# Patient Record
Sex: MALE | NOT HISPANIC OR LATINO
[De-identification: names, ages, dates, MRNs, and addresses within clinical notes are randomized per-mention and may not be internally consistent; named-entity substitution may affect disease eponyms.]

---

## 2020-07-31 LAB — SARS-COV-2 N GENE NPH QL NAA+PROBE: NOT DETECTED

## 2020-08-04 ENCOUNTER — APPOINTMENT (OUTPATIENT)
Dept: PULMONOLOGY | Facility: CLINIC | Age: 60
End: 2020-08-04

## 2020-08-04 ENCOUNTER — APPOINTMENT (OUTPATIENT)
Dept: PULMONOLOGY | Facility: CLINIC | Age: 60
End: 2020-08-04
Payer: COMMERCIAL

## 2020-08-04 PROCEDURE — 88738 HGB QUANT TRANSCUTANEOUS: CPT

## 2020-08-04 PROCEDURE — 94060 EVALUATION OF WHEEZING: CPT

## 2020-08-04 PROCEDURE — 94727 GAS DIL/WSHOT DETER LNG VOL: CPT

## 2020-08-04 PROCEDURE — 99205 OFFICE O/P NEW HI 60 MIN: CPT | Mod: 25

## 2020-08-04 PROCEDURE — 94729 DIFFUSING CAPACITY: CPT

## 2020-08-04 PROCEDURE — ZZZZZ: CPT

## 2020-08-05 LAB — POCT - HEMOGLOBIN (HGB), QUANTITATIVE, TRANSCUTANEOUS: 15

## 2020-08-05 NOTE — PHYSICAL EXAM
[No Acute Distress] : no acute distress [Normal Appearance] : normal appearance [Supple] : supple [Thyroid Not Enlarged] : thyroid not enlarged [Normal S1, S2] : normal s1, s2 [No Murmurs] : no murmurs [No JVD] : no jvd [Clear to Auscultation Bilaterally] : clear to auscultation bilaterally [Normal to Percussion] : normal to percussion [No HSM] : no hsm [Benign] : benign [Not Tender] : not tender [No Cyanosis] : no cyanosis [No Clubbing] : no clubbing [Normal Bowel Sounds] : normal bowel sounds [No Edema] : no edema [No Focal Deficits] : no focal deficits [Oriented x3] : oriented x3

## 2020-08-07 NOTE — DISCUSSION/SUMMARY
[FreeTextEntry1] : Abnormal CT with mediastinal adenopathy some calcified and pulmonary nodules.\par Minimal parenchymal changes\par Most likely sarcoidosis.\par Cannot exclude other granulomatous disease.\par \par Overall nodes decreased without significant change in other findings on CT.\par Pt. clinically and functionally well.

## 2020-08-07 NOTE — PROCEDURE
[FreeTextEntry1] : \par Patient Name:  MARÍA MCDANIELS  Patient ID:  [id number]  \par  \par Patient :   1960 Gender:    Male  \par Accession Number:   [id number] Study Date:   2020 15:03 \par Referring Phys.:  Zachary Maza Performing Location:  [facility]  \par EXAM:  Thorax^CHEST_WO_ (Adult) \par \par \par \par  \par \par IMPRESSION:\par \par Hilar and mediastinal adenopathy have slightly decreased in size compared with the 2019 CT examination. Some of these lymph nodes are again partially calcified. These lymph nodes are again increased in number. This is again most consistent with old granulomatous disease, possibly sarcoidosis.\par \par Pulmonary findings are unchanged compared with the 2019 CT examination.\par \par There are additional findings as are noted in the body of the report.\par \par History: Follow-up lymphadenopathy.\par \par Examination/Technique: CT of the chest was performed without contrast. Comparison is made with the CT examination of the chest from 2019. From the dataset 1.0, 0.75, and 3.0 mm axial images were constructed, as well as axial thin-slab sliding maximum intensity projection images, i.e. 3-dimensional/volumetric images. Coronal and sagittal reformations were also constructed from the dataset and are reviewed. Automated exposure control, customized for the examination, was utilized to reduce radiation exposure to the patient.\par \par Lungs/Airways: As before, there is some increase in the AP diameter of the thorax, increase in the AP diameter of the trachea, and prominence of the diaphragmatic slips. Substantial emphysema is not identified. There is biapical pleural and parenchymal scarring which appears unchanged and relatively symmetric. The trachea is mildly dilated. The trachea and central bronchi are patent and are unchanged. There is some subsegmental atelectasis/air trapping in the lower lungs, in association with some patchy areas of ground-glass opacity and smooth interstitial thickening which is very similar to before. There is no evidence for diffuse fibrosis or honeycombing, but there is again a probable degree of mild pneumonitis.\par \par Abutting the left major fissure on image 52 of the current examination there is again an ovoid 6 mm nodule. There is a lateral right lower lobe subpleural tag on image 64 measuring 5 mm. This is also unchanged. There is a small subpleural tag in the posteromedial aspect of the superior segment of the right lower lobe on image 41. There are a few additional small pulmonary nodules which are unchanged. No new pulmonary nodules, masses, or cavities are identified.\par \par There is no right or left pleural effusion or pneumothorax.\par \par Lymph Nodes: There are again enlarged hilar and mediastinal lymph nodes which are increased in number, some of which are calcified. Right subcarinal adenopathy measures approximately 2.4 x 1.5 cm on image 55 of the current examination, and is slightly smaller than before. Left mediastinal lymph nodes are also slightly smaller. There is a 10 mm lymph node on image 41, and an anterior 14 mm conglomerate area of adenopathy. A right precarinal lymph node which is partially calcified measures 1.6 x 1.0 cm on image 41 of the current examination, compared with 2.0 x 1.2 cm on image 44 of the prior examination, and a 1.8 x 1.3 cm right pretracheal lymph node on image 38 of the current examination measured 19 x 16 mm on image 41 of the prior examination. There are similar changes for the superior and anterior mediastinal lymph nodes.\par \par Hilar adenopathy demonstrates slightly more calcification than before, without substantial changes in size or numbers. There are mildly enlarged bilateral axillary region lymph nodes containing central fat without calcification, which are unchanged. There are also some lower and posterior right mediastinal lymph nodes which are similar to before.\par \par Mediastinum: There is no thyroid nodule. There is no new dominant mediastinal mass. There is no pericardial effusion.\par \par Heart/Vessels: The heart is not enlarged. There are motion artifacts of the ascending aorta. The central vessels are not dilated. Further assessment of the heart and central vessels is not possible without intravenous contrast.\par \par Bones/Soft Tissues: There is some spurring of the thoracolumbar spine, as before. There are relatively mild degenerative changes of the osseous and cartilaginous structures of the anterior chest wall. There is some spurring at the shoulders. The visualized osseous structures are unchanged.\par \par There is mild anasarca. The chest wall is unchanged.\par \par Upper Abdomen: The liver is again denser than the spleen, which may represent iron deposition, measuring 58 Hounsfield units centrally on image 91, compared with 45 Hounsfield units for the spleen on this image. The liver spans approximately 23.8 x 13 cm oblique dimensions in the axial plane on image 91, and is mildly enlarged as before. There is no specific evidence for cirrhosis. No focal abnormalities are identified in the visualized liver.\par \par The visualized spleen, adrenals, kidneys, and pancreas are unchanged on these images obtained without contrast. The stomach is now mildly distended with fluid, food, and a small amount of hyperdense material. There is no ascites, mass, or significant adenopathy, in the upper abdomen. The visualized gallbladder is collapsed.\par \par Electronic Signature: I personally reviewed the images and agree with this report. Final Report: Dictated by and Signed by Attending Matthew England MD 2020 4:23 PM\par  \par \par 2020\par Pulmonary function testing\par Attached

## 2020-08-07 NOTE — ASSESSMENT
[FreeTextEntry1] : Labs drawn in office today\par Observation\par Obtain records especially pathology from bronch/EBUS\par F/U 6 months sooner PRN

## 2020-08-10 LAB
ACE BLD-CCNC: 114 U/L
M TB IFN-G BLD-IMP: NEGATIVE
QUANTIFERON TB PLUS MITOGEN MINUS NIL: 5.56 IU/ML
QUANTIFERON TB PLUS NIL: 0.01 IU/ML
QUANTIFERON TB PLUS TB1 MINUS NIL: 0 IU/ML
QUANTIFERON TB PLUS TB2 MINUS NIL: 0 IU/ML

## 2021-02-17 ENCOUNTER — APPOINTMENT (OUTPATIENT)
Dept: PULMONOLOGY | Facility: CLINIC | Age: 61
End: 2021-02-17
Payer: COMMERCIAL

## 2021-02-17 VITALS
OXYGEN SATURATION: 97 % | DIASTOLIC BLOOD PRESSURE: 72 MMHG | SYSTOLIC BLOOD PRESSURE: 137 MMHG | HEART RATE: 68 BPM | TEMPERATURE: 98.5 F

## 2021-02-17 PROCEDURE — 71046 X-RAY EXAM CHEST 2 VIEWS: CPT

## 2021-02-17 PROCEDURE — 99213 OFFICE O/P EST LOW 20 MIN: CPT | Mod: 25

## 2021-02-17 PROCEDURE — 36415 COLL VENOUS BLD VENIPUNCTURE: CPT

## 2021-02-17 PROCEDURE — 99072 ADDL SUPL MATRL&STAF TM PHE: CPT

## 2021-02-17 NOTE — PROCEDURE
[FreeTextEntry1] : \par Patient Name:  MARÍA MCDANIELS  Patient ID:  [id number]  \par  \par Patient :   1960 Gender:    Male  \par Accession Number:   [id number] Study Date:   2020 15:03 \par Referring Phys.:  Zachary Maza Performing Location:  [facility]  \par EXAM:  Thorax^CHEST_WO_ (Adult) \par \par \par \par  \par \par IMPRESSION:\par \par Hilar and mediastinal adenopathy have slightly decreased in size compared with the 2019 CT examination. Some of these lymph nodes are again partially calcified. These lymph nodes are again increased in number. This is again most consistent with old granulomatous disease, possibly sarcoidosis.\par \par Pulmonary findings are unchanged compared with the 2019 CT examination.\par \par There are additional findings as are noted in the body of the report.\par \par History: Follow-up lymphadenopathy.\par \par Examination/Technique: CT of the chest was performed without contrast. Comparison is made with the CT examination of the chest from 2019. From the dataset 1.0, 0.75, and 3.0 mm axial images were constructed, as well as axial thin-slab sliding maximum intensity projection images, i.e. 3-dimensional/volumetric images. Coronal and sagittal reformations were also constructed from the dataset and are reviewed. Automated exposure control, customized for the examination, was utilized to reduce radiation exposure to the patient.\par \par Lungs/Airways: As before, there is some increase in the AP diameter of the thorax, increase in the AP diameter of the trachea, and prominence of the diaphragmatic slips. Substantial emphysema is not identified. There is biapical pleural and parenchymal scarring which appears unchanged and relatively symmetric. The trachea is mildly dilated. The trachea and central bronchi are patent and are unchanged. There is some subsegmental atelectasis/air trapping in the lower lungs, in association with some patchy areas of ground-glass opacity and smooth interstitial thickening which is very similar to before. There is no evidence for diffuse fibrosis or honeycombing, but there is again a probable degree of mild pneumonitis.\par \par Abutting the left major fissure on image 52 of the current examination there is again an ovoid 6 mm nodule. There is a lateral right lower lobe subpleural tag on image 64 measuring 5 mm. This is also unchanged. There is a small subpleural tag in the posteromedial aspect of the superior segment of the right lower lobe on image 41. There are a few additional small pulmonary nodules which are unchanged. No new pulmonary nodules, masses, or cavities are identified.\par \par There is no right or left pleural effusion or pneumothorax.\par \par Lymph Nodes: There are again enlarged hilar and mediastinal lymph nodes which are increased in number, some of which are calcified. Right subcarinal adenopathy measures approximately 2.4 x 1.5 cm on image 55 of the current examination, and is slightly smaller than before. Left mediastinal lymph nodes are also slightly smaller. There is a 10 mm lymph node on image 41, and an anterior 14 mm conglomerate area of adenopathy. A right precarinal lymph node which is partially calcified measures 1.6 x 1.0 cm on image 41 of the current examination, compared with 2.0 x 1.2 cm on image 44 of the prior examination, and a 1.8 x 1.3 cm right pretracheal lymph node on image 38 of the current examination measured 19 x 16 mm on image 41 of the prior examination. There are similar changes for the superior and anterior mediastinal lymph nodes.\par \par Hilar adenopathy demonstrates slightly more calcification than before, without substantial changes in size or numbers. There are mildly enlarged bilateral axillary region lymph nodes containing central fat without calcification, which are unchanged. There are also some lower and posterior right mediastinal lymph nodes which are similar to before.\par \par Mediastinum: There is no thyroid nodule. There is no new dominant mediastinal mass. There is no pericardial effusion.\par \par Heart/Vessels: The heart is not enlarged. There are motion artifacts of the ascending aorta. The central vessels are not dilated. Further assessment of the heart and central vessels is not possible without intravenous contrast.\par \par Bones/Soft Tissues: There is some spurring of the thoracolumbar spine, as before. There are relatively mild degenerative changes of the osseous and cartilaginous structures of the anterior chest wall. There is some spurring at the shoulders. The visualized osseous structures are unchanged.\par \par There is mild anasarca. The chest wall is unchanged.\par \par Upper Abdomen: The liver is again denser than the spleen, which may represent iron deposition, measuring 58 Hounsfield units centrally on image 91, compared with 45 Hounsfield units for the spleen on this image. The liver spans approximately 23.8 x 13 cm oblique dimensions in the axial plane on image 91, and is mildly enlarged as before. There is no specific evidence for cirrhosis. No focal abnormalities are identified in the visualized liver.\par \par The visualized spleen, adrenals, kidneys, and pancreas are unchanged on these images obtained without contrast. The stomach is now mildly distended with fluid, food, and a small amount of hyperdense material. There is no ascites, mass, or significant adenopathy, in the upper abdomen. The visualized gallbladder is collapsed.\par \par Electronic Signature: I personally reviewed the images and agree with this report. Final Report: Dictated by and Signed by Attending Matthew England MD 2020 4:23 PM\par  \par \par 2020\par Pulmonary function testing\par Attached

## 2021-02-17 NOTE — REASON FOR VISIT
[Abnormal CXR/ Chest CT] : an abnormal CXR/ chest CT [Follow-Up] : a follow-up visit [TextBox_44] : Sarcoidosis

## 2021-02-17 NOTE — DISCUSSION/SUMMARY
[FreeTextEntry1] : Abnormal CT with mediastinal adenopathy some calcified and pulmonary nodules.\par Minimal parenchymal changes\par Most likely sarcoidosis.\par Cannot exclude other granulomatous disease.\par \par Overall nodes decreased without significant change in other findings on CT.\par Pt. clinically stable.\par \par

## 2021-02-17 NOTE — PHYSICAL EXAM
[No Acute Distress] : no acute distress [Normal Appearance] : normal appearance [Supple] : supple [Thyroid Not Enlarged] : thyroid not enlarged [No JVD] : no jvd [Normal S1, S2] : normal s1, s2 [No Murmurs] : no murmurs [Clear to Auscultation Bilaterally] : clear to auscultation bilaterally [Normal to Percussion] : normal to percussion [Benign] : benign [Not Tender] : not tender [No HSM] : no hsm [Normal Bowel Sounds] : normal bowel sounds [No Clubbing] : no clubbing [No Cyanosis] : no cyanosis [No Edema] : no edema [No Focal Deficits] : no focal deficits [Oriented x3] : oriented x3

## 2021-02-17 NOTE — HISTORY OF PRESENT ILLNESS
From: Ely Mercado  To: Dinorah Judge MD  Sent: 2/5/2020 8:37 AM CST  Subject: Other    Good morning Dr Judge and Team!  I am wondering if there is another med that works comparably to Lisinopril with the Coreg I am taking to help keep my heart muscle strong. My issue is severe leg pain! By severe, i mean pain that wakes me up at night and during the day I want to cry. I do have back issues, however, this pain is very different and think it may be from the lisinopril. I recently started taking lisinopril since my last visit with Dr Judge and have noticed a gradual increase in this pain. I started with 5 mg and am up to 10mg as of two nights ago. I decided I am going to stop taking it last night to be sure this is the problem. I am attempting to control all aspects of my body with your wonderful Teams help! I am in need of any guidance you're able to provide!   Thank you!  :)  
Per Dr. Judge, stop atorvastatin 10 mg daily and check CK level. Orders placed and patient notified via email. Med list updated.   
[TextBox_4] : Resp status fine.\par No significant cough, wheezing, chest pain or SOB.\par Saw optho. No sarcoid. \par Presently feeling well.\par

## 2021-02-24 LAB — ACE BLD-CCNC: 92 U/L

## 2021-06-18 ENCOUNTER — NON-APPOINTMENT (OUTPATIENT)
Age: 61
End: 2021-06-18

## 2021-06-18 ENCOUNTER — APPOINTMENT (OUTPATIENT)
Dept: GASTROENTEROLOGY | Facility: CLINIC | Age: 61
End: 2021-06-18
Payer: COMMERCIAL

## 2021-06-18 VITALS
DIASTOLIC BLOOD PRESSURE: 70 MMHG | OXYGEN SATURATION: 99 % | HEIGHT: 71 IN | WEIGHT: 214.5 LBS | TEMPERATURE: 97.7 F | HEART RATE: 72 BPM | SYSTOLIC BLOOD PRESSURE: 118 MMHG | BODY MASS INDEX: 30.03 KG/M2

## 2021-06-18 DIAGNOSIS — Z12.10 ENCOUNTER FOR SCREENING FOR MALIGNANT NEOPLASM OF INTESTINAL TRACT, UNSPECIFIED: ICD-10-CM

## 2021-06-18 DIAGNOSIS — K21.9 GASTRO-ESOPHAGEAL REFLUX DISEASE W/OUT ESOPHAGITIS: ICD-10-CM

## 2021-06-18 DIAGNOSIS — R42 DIZZINESS AND GIDDINESS: ICD-10-CM

## 2021-06-18 PROCEDURE — 99202 OFFICE O/P NEW SF 15 MIN: CPT

## 2021-06-18 PROCEDURE — 99072 ADDL SUPL MATRL&STAF TM PHE: CPT

## 2021-06-18 NOTE — REVIEW OF SYSTEMS
[Negative] : Genitourinary [FreeTextEntry5] : No current cardiac symptoms. [FreeTextEntry4] : Patient has a dry mouth secondary to sarcoidosis.

## 2021-06-18 NOTE — HISTORY OF PRESENT ILLNESS
[FreeTextEntry1] : I saw Mr. Lake Weldon in the office today.  Patient is a 60-year-old male who has a history of reflux and takes a proton pump inhibitor as needed.  Has no history of diabetes or coronary artery disease and his appetite is good with no dysphagia or unexplained weight loss.  Bowel movements are usually normal with no blood in the stool or on the toilet tissue.  Patient has 1 to 2 cups of caffeine a day rarely has ethanol and does not smoke.  Patient had a colonoscopy done 10 years ago and is due for repeat exam.  Patient noticed that he has decreased salivation blamed on his sarcoidosis and when he eats dry consistencies he needs to drink sips of water in order to facilitate swallowing.  He has had endoscopies in the past and there is no history of food impaction.  He was hospitalized in the past for some ENT issues but these have resolved.

## 2021-06-18 NOTE — PHYSICAL EXAM
[General Appearance - Alert] : alert [General Appearance - Well Nourished] : well nourished [General Appearance - In No Acute Distress] : in no acute distress [General Appearance - Well Developed] : well developed [Respiration, Rhythm And Depth] : normal respiratory rhythm and effort [Exaggerated Use Of Accessory Muscles For Inspiration] : no accessory muscle use [Auscultation Breath Sounds / Voice Sounds] : lungs were clear to auscultation bilaterally [Apical Impulse] : the apical impulse was normal [Heart Rate And Rhythm] : heart rate was normal and rhythm regular [Heart Sounds] : normal S1 and S2 [Bowel Sounds] : normal bowel sounds [Abdomen Soft] : soft [Abdomen Tenderness] : non-tender [] : no hepato-splenomegaly

## 2021-06-18 NOTE — ASSESSMENT
[FreeTextEntry1] : Mr. Weldon is a 60-year-old male who has no history of hypertension diabetes or coronary artery disease.  He has had an issue with a cardiac dysrhythmia in the past and is currently asymptomatic.  Patient has known sarcoidosis and has had some ENT issues in the past.  Currently he notices certain consistencies that are dry cause him to have a little bit of discomfort and drinking copious amounts of water help him with this symptom.  Bowel movements are normal and he is due for repeat colonoscopy.  The risk benefits and screening protocols were discussed in detail with the patient.  He will be scheduled at his earliest convenience and once performed I will distribute a copy.  If normal the patient can continue to have the colonoscopy done at 10-year intervals.

## 2021-08-18 ENCOUNTER — APPOINTMENT (OUTPATIENT)
Dept: PULMONOLOGY | Facility: CLINIC | Age: 61
End: 2021-08-18

## 2021-08-24 DIAGNOSIS — Z01.818 ENCOUNTER FOR OTHER PREPROCEDURAL EXAMINATION: ICD-10-CM

## 2021-08-24 DIAGNOSIS — Z20.822 CONTACT WITH AND (SUSPECTED) EXPOSURE TO COVID-19: ICD-10-CM

## 2021-09-06 LAB — SARS-COV-2 N GENE NPH QL NAA+PROBE: NOT DETECTED

## 2021-09-08 ENCOUNTER — APPOINTMENT (OUTPATIENT)
Dept: GASTROENTEROLOGY | Facility: AMBULATORY MEDICAL SERVICES | Age: 61
End: 2021-09-08
Payer: COMMERCIAL

## 2021-09-08 PROCEDURE — 45378 DIAGNOSTIC COLONOSCOPY: CPT

## 2021-09-15 ENCOUNTER — APPOINTMENT (OUTPATIENT)
Dept: PULMONOLOGY | Facility: CLINIC | Age: 61
End: 2021-09-15

## 2021-11-30 ENCOUNTER — APPOINTMENT (OUTPATIENT)
Dept: PULMONOLOGY | Facility: CLINIC | Age: 61
End: 2021-11-30
Payer: COMMERCIAL

## 2021-11-30 VITALS
TEMPERATURE: 97.8 F | HEART RATE: 85 BPM | SYSTOLIC BLOOD PRESSURE: 154 MMHG | DIASTOLIC BLOOD PRESSURE: 88 MMHG | OXYGEN SATURATION: 96 %

## 2021-11-30 DIAGNOSIS — Z20.822 CONTACT WITH AND (SUSPECTED) EXPOSURE TO COVID-19: ICD-10-CM

## 2021-11-30 PROCEDURE — 99214 OFFICE O/P EST MOD 30 MIN: CPT | Mod: 25

## 2021-11-30 PROCEDURE — 36415 COLL VENOUS BLD VENIPUNCTURE: CPT

## 2021-11-30 NOTE — HISTORY OF PRESENT ILLNESS
[TextBox_4] : seeing Dr Jones eye MD being treated for acute uveitis and was not responding well to steroid drops after 1 month so did labs and had a intermediate quant gold and a positive AMY\par \par Had a chest x ray through [facility]\par \par no breathing issues\par  no sob or cough\par No wheezing or CP\par No constitutional symptoms.\par \par On Prednisone 80 now down to 40. \par \par Has f/u appt with optho.\par

## 2021-11-30 NOTE — REASON FOR VISIT
[Follow-Up] : a follow-up visit [Abnormal CXR/ Chest CT] : an abnormal CXR/ chest CT [TextBox_44] : Sarcoidosis

## 2021-11-30 NOTE — DISCUSSION/SUMMARY
[FreeTextEntry1] : Clinical and radiographic findings consistent with sarcoidosis.\par Significantly less likely uveitis associated with tuberculosis or systemic lupus.  No evidence of other infectious or inflammatory etiologies.\par We will attempt to exclude other etiologies.\par

## 2021-11-30 NOTE — ASSESSMENT
[FreeTextEntry1] : \par Obtain records especially pathology from bronch/EBUS\par Labs drawn in office today\par Will need PPD if QuantiFERON still indeterminate.  Prefer to do 1 lower doses of prednisone.\par Follow-up CT of the chest.\par Patient to return for lung function testing.\par Angiotensin-converting enzyme level sent.\par Discussed with ophthalmology.\par

## 2021-11-30 NOTE — PROCEDURE
[FreeTextEntry1] : \par Patient Name:  MARÍA MCDANIELS  Patient ID:  [id number]  \par  \par Patient :   1960 Gender:    Male  \par Accession Number:   [id number] Study Date:   2020 15:03 \par Referring Phys.:  Zachary Maza Performing Location:  [facility]  \par EXAM:  Thorax^CHEST_WO_ (Adult) \par \par \par \par  \par \par IMPRESSION:\par \par Hilar and mediastinal adenopathy have slightly decreased in size compared with the 2019 CT examination. Some of these lymph nodes are again partially calcified. These lymph nodes are again increased in number. This is again most consistent with old granulomatous disease, possibly sarcoidosis.\par \par Pulmonary findings are unchanged compared with the 2019 CT examination.\par \par There are additional findings as are noted in the body of the report.\par \par History: Follow-up lymphadenopathy.\par \par Examination/Technique: CT of the chest was performed without contrast. Comparison is made with the CT examination of the chest from 2019. From the dataset 1.0, 0.75, and 3.0 mm axial images were constructed, as well as axial thin-slab sliding maximum intensity projection images, i.e. 3-dimensional/volumetric images. Coronal and sagittal reformations were also constructed from the dataset and are reviewed. Automated exposure control, customized for the examination, was utilized to reduce radiation exposure to the patient.\par \par Lungs/Airways: As before, there is some increase in the AP diameter of the thorax, increase in the AP diameter of the trachea, and prominence of the diaphragmatic slips. Substantial emphysema is not identified. There is biapical pleural and parenchymal scarring which appears unchanged and relatively symmetric. The trachea is mildly dilated. The trachea and central bronchi are patent and are unchanged. There is some subsegmental atelectasis/air trapping in the lower lungs, in association with some patchy areas of ground-glass opacity and smooth interstitial thickening which is very similar to before. There is no evidence for diffuse fibrosis or honeycombing, but there is again a probable degree of mild pneumonitis.\par \par Abutting the left major fissure on image 52 of the current examination there is again an ovoid 6 mm nodule. There is a lateral right lower lobe subpleural tag on image 64 measuring 5 mm. This is also unchanged. There is a small subpleural tag in the posteromedial aspect of the superior segment of the right lower lobe on image 41. There are a few additional small pulmonary nodules which are unchanged. No new pulmonary nodules, masses, or cavities are identified.\par \par There is no right or left pleural effusion or pneumothorax.\par \par Lymph Nodes: There are again enlarged hilar and mediastinal lymph nodes which are increased in number, some of which are calcified. Right subcarinal adenopathy measures approximately 2.4 x 1.5 cm on image 55 of the current examination, and is slightly smaller than before. Left mediastinal lymph nodes are also slightly smaller. There is a 10 mm lymph node on image 41, and an anterior 14 mm conglomerate area of adenopathy. A right precarinal lymph node which is partially calcified measures 1.6 x 1.0 cm on image 41 of the current examination, compared with 2.0 x 1.2 cm on image 44 of the prior examination, and a 1.8 x 1.3 cm right pretracheal lymph node on image 38 of the current examination measured 19 x 16 mm on image 41 of the prior examination. There are similar changes for the superior and anterior mediastinal lymph nodes.\par \par Hilar adenopathy demonstrates slightly more calcification than before, without substantial changes in size or numbers. There are mildly enlarged bilateral axillary region lymph nodes containing central fat without calcification, which are unchanged. There are also some lower and posterior right mediastinal lymph nodes which are similar to before.\par \par Mediastinum: There is no thyroid nodule. There is no new dominant mediastinal mass. There is no pericardial effusion.\par \par Heart/Vessels: The heart is not enlarged. There are motion artifacts of the ascending aorta. The central vessels are not dilated. Further assessment of the heart and central vessels is not possible without intravenous contrast.\par \par Bones/Soft Tissues: There is some spurring of the thoracolumbar spine, as before. There are relatively mild degenerative changes of the osseous and cartilaginous structures of the anterior chest wall. There is some spurring at the shoulders. The visualized osseous structures are unchanged.\par \par There is mild anasarca. The chest wall is unchanged.\par \par Upper Abdomen: The liver is again denser than the spleen, which may represent iron deposition, measuring 58 Hounsfield units centrally on image 91, compared with 45 Hounsfield units for the spleen on this image. The liver spans approximately 23.8 x 13 cm oblique dimensions in the axial plane on image 91, and is mildly enlarged as before. There is no specific evidence for cirrhosis. No focal abnormalities are identified in the visualized liver.\par \par The visualized spleen, adrenals, kidneys, and pancreas are unchanged on these images obtained without contrast. The stomach is now mildly distended with fluid, food, and a small amount of hyperdense material. There is no ascites, mass, or significant adenopathy, in the upper abdomen. The visualized gallbladder is collapsed.\par \par Electronic Signature: I personally reviewed the images and agree with this report. Final Report: Dictated by and Signed by Attending Matthew England MD 2020 4:23 PM\par  \par \par 2020\par Pulmonary function testing\par Attached

## 2021-12-03 ENCOUNTER — NON-APPOINTMENT (OUTPATIENT)
Age: 61
End: 2021-12-03

## 2021-12-03 LAB
ACE BLD-CCNC: 40 U/L
M TB IFN-G BLD-IMP: NEGATIVE
QUANTIFERON TB PLUS MITOGEN MINUS NIL: 0.53 IU/ML
QUANTIFERON TB PLUS NIL: 0.01 IU/ML
QUANTIFERON TB PLUS TB1 MINUS NIL: 0 IU/ML
QUANTIFERON TB PLUS TB2 MINUS NIL: 0 IU/ML

## 2021-12-07 ENCOUNTER — APPOINTMENT (OUTPATIENT)
Dept: RHEUMATOLOGY | Facility: CLINIC | Age: 61
End: 2021-12-07
Payer: COMMERCIAL

## 2021-12-07 VITALS
BODY MASS INDEX: 29.4 KG/M2 | DIASTOLIC BLOOD PRESSURE: 80 MMHG | TEMPERATURE: 98 F | WEIGHT: 210 LBS | SYSTOLIC BLOOD PRESSURE: 128 MMHG | HEIGHT: 71 IN | HEART RATE: 71 BPM | OXYGEN SATURATION: 99 % | RESPIRATION RATE: 17 BRPM

## 2021-12-07 DIAGNOSIS — Z82.61 FAMILY HISTORY OF ARTHRITIS: ICD-10-CM

## 2021-12-07 PROCEDURE — 36415 COLL VENOUS BLD VENIPUNCTURE: CPT

## 2021-12-07 PROCEDURE — 99205 OFFICE O/P NEW HI 60 MIN: CPT | Mod: 25

## 2021-12-07 RX ORDER — OMEPRAZOLE 20 MG/1
20 TABLET, DELAYED RELEASE ORAL
Refills: 0 | Status: ACTIVE | COMMUNITY

## 2021-12-07 NOTE — ASSESSMENT
[FreeTextEntry1] : 61 year old male with sarcoidosis presents with recent onset of uveitis.  Given the lack of other symptoms and negative workup for other etiologies, the uveitis is most likely secondary to the sarcoidosis.  He was also found to have a positive AMY.  He does not exhibit any obvious signs/symptoms of connective tissue disease at this time, other than dry mouth which can occur in Sjogren's Syndrome but is very non-specific.  I have therefore ordered some more bloodwork, including serologies, as further workup.\par Pt will be following up with his uveitis specialist next week.  If no significant improvement by then, may need to discuss starting him on an immunosuppressive agent.\par

## 2021-12-07 NOTE — DATA REVIEWED
[FreeTextEntry1] : 11/3/2021:\par ESR 64\par C3/C4 191/26\par (+)AMY 1:320\par RF negative\par ANCA negative\par HLA-B27 negative

## 2021-12-07 NOTE — HISTORY OF PRESENT ILLNESS
[Dry Mouth] : dry mouth [Eye Pain] : eye pain [Eye Redness] : eye redness [FreeTextEntry1] : 61 year old male with PMHx as listed below reports that in February 2019, he began to experience dysphagia.  He went to the ED, where imaging revealed lymphadenopathy.  He was sent for a biopsy, which was reportedly inconclusive, but felt to be most c/w sarcoidosis.  He saw ENT, who found "pockets around the vocal cords", which had become inflamed.  He was started on medication (he doesn't remember the name) and  the dysphagia resolved.  He was also referred to pulmonology, with whom he has been following since then.  \par About 2 months, his left eye became red and extremely painful.  [facility] a steroid ointment and ibuprofen 800mg, on which the symptoms resolve.d  However, about 3 days after stopping the medications, the symptoms recurred.  [facility] to [facility], who re-started the same regimen, but this time it didn't help.  He was also tried on Lotemax ointment, but it didn't help either.  He was therefore referred to a uveitis specialist.  He was stared on prednisone 70mg daily, on which he improved byu still had some residual inflammation.  He was then put on prednisone 80mg daily for 1 week, but still did not experience full resolution of the inflammation.  He is currently in the midst of a taper - it was decreased to 20mg daily.  He also was sent for bloodwork, which revealed a positive AMY.  \par He also c/o persistent dry mouth. [Anorexia] : no anorexia [Weight Loss] : no weight loss [Malaise] : no malaise [Fever] : no fever [Chills] : no chills [Fatigue] : no fatigue [Malar Facial Rash] : no malar facial rash [Skin Lesions] : no lesions [Skin Nodules] : no skin nodules [Oral Ulcers] : no oral ulcers [Cough] : no cough [Dysphonia] : no dysphonia [Dysphagia] : no dysphagia [Shortness of Breath] : no shortness of breath [Chest Pain] : no chest pain [Arthralgias] : no arthralgias [Joint Swelling] : no joint swelling [Joint Warmth] : no joint warmth [Joint Deformity] : no joint deformity [Decreased ROM] : no decreased range of motion [Morning Stiffness] : no morning stiffness [Falls] : no falls [Difficulty Standing] : no difficulty standing [Difficulty Walking] : no difficulty walking [Dyspnea] : no dyspnea [Myalgias] : no myalgias [Muscle Weakness] : no muscle weakness [Muscle Spasms] : no muscle spasms [Muscle Cramping] : no muscle cramping [Visual Changes] : no visual changes [Dry Eyes] : no dry eyes

## 2021-12-08 LAB
C3 SERPL-MCNC: 151 MG/DL
C4 SERPL-MCNC: 20 MG/DL
CRP SERPL-MCNC: <3 MG/L
ENA RNP AB SER IA-ACNC: <0.2 AL
ENA SM AB SER IA-ACNC: <0.2 AL
ENA SS-A AB SER IA-ACNC: <0.2 AL
ENA SS-B AB SER IA-ACNC: <0.2 AL
ERYTHROCYTE [SEDIMENTATION RATE] IN BLOOD BY WESTERGREN METHOD: 14 MM/HR
THYROGLOB AB SERPL-ACNC: <20 IU/ML
THYROPEROXIDASE AB SERPL IA-ACNC: 20.5 IU/ML

## 2021-12-09 LAB
ACE BLD-CCNC: 43 U/L
DSDNA AB SER-ACNC: <12 IU/ML

## 2021-12-10 LAB — SARS-COV-2 N GENE NPH QL NAA+PROBE: NOT DETECTED

## 2021-12-14 ENCOUNTER — TRANSCRIPTION ENCOUNTER (OUTPATIENT)
Age: 61
End: 2021-12-14

## 2021-12-14 ENCOUNTER — APPOINTMENT (OUTPATIENT)
Dept: PULMONOLOGY | Facility: CLINIC | Age: 61
End: 2021-12-14
Payer: COMMERCIAL

## 2021-12-14 VITALS — SYSTOLIC BLOOD PRESSURE: 140 MMHG | DIASTOLIC BLOOD PRESSURE: 80 MMHG

## 2021-12-14 VITALS
TEMPERATURE: 98 F | BODY MASS INDEX: 29.4 KG/M2 | HEIGHT: 71 IN | OXYGEN SATURATION: 96 % | HEART RATE: 76 BPM | DIASTOLIC BLOOD PRESSURE: 77 MMHG | RESPIRATION RATE: 16 BRPM | SYSTOLIC BLOOD PRESSURE: 155 MMHG | WEIGHT: 210 LBS

## 2021-12-14 PROCEDURE — ZZZZZ: CPT

## 2021-12-14 PROCEDURE — 94729 DIFFUSING CAPACITY: CPT

## 2021-12-14 PROCEDURE — 99213 OFFICE O/P EST LOW 20 MIN: CPT | Mod: 25

## 2021-12-14 PROCEDURE — 94010 BREATHING CAPACITY TEST: CPT

## 2021-12-14 PROCEDURE — 94726 PLETHYSMOGRAPHY LUNG VOLUMES: CPT

## 2021-12-14 PROCEDURE — 88738 HGB QUANT TRANSCUTANEOUS: CPT

## 2021-12-16 ENCOUNTER — NON-APPOINTMENT (OUTPATIENT)
Age: 61
End: 2021-12-16

## 2021-12-16 NOTE — PROCEDURE
[FreeTextEntry1] : 12/14/2021\par Pulmonary function testing\par FEV1, FVC, and FEV1/FVC are within normal limits. TLC and subdivisions are normal. RV/TLC ratio is normal. Resistance is normal; specific conductance is decreased. Single breath diffusion capacity is normal. \par PFT attached relatively stable function.\par \par \par Ct chest Dec 6 th 2021 reviewed\par \par \par  \par

## 2021-12-16 NOTE — ASSESSMENT
[FreeTextEntry1] : \par Obtain records especially pathology from bronch/EBUS\par Patient instructed to obtain prior CAT scan and bring to [facility] radiology for comparison.\par Ophthalmology follow-up.\par Further recommendations after review of above.\par

## 2021-12-16 NOTE — HISTORY OF PRESENT ILLNESS
[TextBox_4] : \par \par down to prednisone 15 mg\par Added Celbrex BID\par Mild improvement in ocular symptoms.\par No other significant complaints.\par \par \par

## 2021-12-16 NOTE — DISCUSSION/SUMMARY
[FreeTextEntry1] : Clinical and radiographic findings consistent with sarcoidosis.\par Main issues at this time related to uveitis.\par CT findings likely stable however performed at different facility.\par Functionally stable.\par

## 2022-01-13 ENCOUNTER — APPOINTMENT (OUTPATIENT)
Dept: RHEUMATOLOGY | Facility: CLINIC | Age: 62
End: 2022-01-13
Payer: COMMERCIAL

## 2022-01-13 VITALS
HEIGHT: 71 IN | OXYGEN SATURATION: 98 % | DIASTOLIC BLOOD PRESSURE: 80 MMHG | BODY MASS INDEX: 29.4 KG/M2 | RESPIRATION RATE: 18 BRPM | TEMPERATURE: 97.6 F | WEIGHT: 210 LBS | HEART RATE: 62 BPM | SYSTOLIC BLOOD PRESSURE: 116 MMHG

## 2022-01-13 DIAGNOSIS — R76.8 OTHER SPECIFIED ABNORMAL IMMUNOLOGICAL FINDINGS IN SERUM: ICD-10-CM

## 2022-01-13 DIAGNOSIS — R68.2 DRY MOUTH, UNSPECIFIED: ICD-10-CM

## 2022-01-13 PROCEDURE — 99214 OFFICE O/P EST MOD 30 MIN: CPT

## 2022-01-13 RX ORDER — CELECOXIB 100 MG/1
100 CAPSULE ORAL
Refills: 0 | Status: ACTIVE | COMMUNITY

## 2022-01-13 NOTE — PHYSICAL EXAM
[General Appearance - Alert] : alert [General Appearance - In No Acute Distress] : in no acute distress [Outer Ear] : the ears and nose were normal in appearance [Oropharynx] : the oropharynx was normal [Neck Appearance] : the appearance of the neck was normal [Neck Cervical Mass (___cm)] : no neck mass was observed [Jugular Venous Distention Increased] : there was no jugular-venous distention [Thyroid Diffuse Enlargement] : the thyroid was not enlarged [Thyroid Nodule] : there were no palpable thyroid nodules [Auscultation Breath Sounds / Voice Sounds] : lungs were clear to auscultation bilaterally [Heart Rate And Rhythm] : heart rate was normal and rhythm regular [Heart Sounds] : normal S1 and S2 [Heart Sounds Gallop] : no gallops [Murmurs] : no murmurs [Heart Sounds Pericardial Friction Rub] : no pericardial rub [Edema] : there was no peripheral edema [Bowel Sounds] : normal bowel sounds [Abdomen Soft] : soft [Abdomen Tenderness] : non-tender [Abdomen Mass (___ Cm)] : no abdominal mass palpated [Cervical Lymph Nodes Enlarged Posterior Bilaterally] : posterior cervical [Cervical Lymph Nodes Enlarged Anterior Bilaterally] : anterior cervical [Supraclavicular Lymph Nodes Enlarged Bilaterally] : supraclavicular [No Spinal Tenderness] : no spinal tenderness [Skin Color & Pigmentation] : normal skin color and pigmentation [Skin Turgor] : normal skin turgor [] : no rash [No Focal Deficits] : no focal deficits [Oriented To Time, Place, And Person] : oriented to person, place, and time [Impaired Insight] : insight and judgment were intact [Affect] : the affect was normal [FreeTextEntry1] : No synovitis, full ROM in all joints\par

## 2022-01-13 NOTE — HISTORY OF PRESENT ILLNESS
[Dry Mouth] : dry mouth [Eye Pain] : eye pain [Eye Redness] : eye redness [FreeTextEntry1] : Still w/ mild pain, erythema in his left eye.  He has tapered prednisone to 7.5mg daily. He reports that he recently followed up with his uveitis specialist, who told him that he still has mildly active uveitis.  No other complaints. [Anorexia] : no anorexia [Weight Loss] : no weight loss [Malaise] : no malaise [Fever] : no fever [Chills] : no chills [Fatigue] : no fatigue [Malar Facial Rash] : no malar facial rash [Skin Lesions] : no lesions [Skin Nodules] : no skin nodules [Oral Ulcers] : no oral ulcers [Cough] : no cough [Dysphonia] : no dysphonia [Dysphagia] : no dysphagia [Shortness of Breath] : no shortness of breath [Chest Pain] : no chest pain [Arthralgias] : no arthralgias [Joint Swelling] : no joint swelling [Joint Warmth] : no joint warmth [Joint Deformity] : no joint deformity [Decreased ROM] : no decreased range of motion [Morning Stiffness] : no morning stiffness [Falls] : no falls [Difficulty Standing] : no difficulty standing [Difficulty Walking] : no difficulty walking [Dyspnea] : no dyspnea [Myalgias] : no myalgias [Muscle Weakness] : no muscle weakness [Muscle Spasms] : no muscle spasms [Muscle Cramping] : no muscle cramping [Visual Changes] : no visual changes [Dry Eyes] : no dry eyes

## 2022-01-13 NOTE — ASSESSMENT
[FreeTextEntry1] : 61 year old male with suspected sarcoidosis and recent onset of uveitis - likely secondary to sarcoidosis.  No obvious signs/symptoms of other underlying connective tissue disorders.\par   - Cont prednisone taper as per ophthalmology.\par   - Cont Celebrex prn\par   - Advised pt to f/u if ophtho feels immunosuppression is indicated and requires assistance w/ prescription/monitoring.\par

## 2022-01-13 NOTE — REVIEW OF SYSTEMS
Consent: The patient's consent was obtained including but not limited to risks of crusting, scabbing, blistering, scarring, darker or lighter pigmentary change, recurrence, incomplete removal and infection. Anesthesia Volume In Cc: 0.5 [As Noted in HPI] : as noted in HPI [Negative] : Heme/Lymph Add 52 Modifier (Optional): no Medical Necessity Information: It is in your best interest to select a reason for this procedure from the list below. All of these items fulfill various CMS LCD requirements except the new and changing color options. Medical Necessity Clause: This procedure was medically necessary because the lesions that were treated were: Treatment Number (Will Not Render If 0): 0 Detail Level: Detailed Post-Care Instructions: I reviewed with the patient in detail post-care instructions. Patient is to wear sunprotection, and avoid picking at any of the treated lesions. Pt may apply Vaseline to crusted or scabbing areas.

## 2022-01-18 ENCOUNTER — APPOINTMENT (OUTPATIENT)
Dept: RHEUMATOLOGY | Facility: CLINIC | Age: 62
End: 2022-01-18

## 2022-12-20 ENCOUNTER — APPOINTMENT (OUTPATIENT)
Dept: PULMONOLOGY | Facility: CLINIC | Age: 62
End: 2022-12-20

## 2022-12-20 VITALS — OXYGEN SATURATION: 96 % | SYSTOLIC BLOOD PRESSURE: 145 MMHG | HEART RATE: 65 BPM | DIASTOLIC BLOOD PRESSURE: 78 MMHG

## 2022-12-20 DIAGNOSIS — H04.123 DRY EYE SYNDROME OF BILATERAL LACRIMAL GLANDS: ICD-10-CM

## 2022-12-20 DIAGNOSIS — Z23 ENCOUNTER FOR IMMUNIZATION: ICD-10-CM

## 2022-12-20 LAB — POCT - HEMOGLOBIN (HGB), QUANTITATIVE, TRANSCUTANEOUS: 17.1

## 2022-12-20 PROCEDURE — 94727 GAS DIL/WSHOT DETER LNG VOL: CPT

## 2022-12-20 PROCEDURE — 94729 DIFFUSING CAPACITY: CPT

## 2022-12-20 PROCEDURE — 90686 IIV4 VACC NO PRSV 0.5 ML IM: CPT

## 2022-12-20 PROCEDURE — 94010 BREATHING CAPACITY TEST: CPT

## 2022-12-20 PROCEDURE — 88738 HGB QUANT TRANSCUTANEOUS: CPT

## 2022-12-20 PROCEDURE — G0008: CPT

## 2022-12-20 PROCEDURE — 99214 OFFICE O/P EST MOD 30 MIN: CPT | Mod: 25

## 2022-12-20 RX ORDER — PREDNISONE 20 MG/1
20 TABLET ORAL
Refills: 0 | Status: DISCONTINUED | COMMUNITY
End: 2022-12-20

## 2022-12-22 PROBLEM — H04.123 DRY EYES, BILATERAL: Status: ACTIVE | Noted: 2022-12-22

## 2022-12-22 NOTE — ASSESSMENT
[FreeTextEntry1] : \par Obtain records especially pathology from bronch/EBUS 2019 [place]\par Repeat CT of the chest.\par Further recommendations after review of above.  If stable will follow-up in 1 year.\par Ophthalmology follow-up.\par \par \par 35 minutes spent in evaluation management and review of studies.\par \par

## 2022-12-22 NOTE — DISCUSSION/SUMMARY
[FreeTextEntry1] : Clinical and radiographic findings consistent with sarcoidosis.\par Patient clinically stable.\par Mild decrease in flow rates likely not of clinical concern.  Will follow.\par

## 2022-12-22 NOTE — PROCEDURE
[FreeTextEntry1] : 12/20/2022\par Pulmonary function testing\par FEV1, FVC, and FEV1/FVC are within normal limits. TLC and subdivisions are normal. RV/TLC ratio is normal. Single breath diffusion capacity is normal. \par Compared to December 2021 there is mild decrease in flow rates without change in diffusion capacity.\par \par \par \par Ct chest Dec 6 th 2021 reviewed\par \par \par  \par

## 2022-12-22 NOTE — HISTORY OF PRESENT ILLNESS
[TextBox_4] : came off prednisone in April for uvulitis, has seen multiple eye MD for dry eye , no uveitis \par on treatment for Glaucoma. Precautionary.\par \par Occ  cough in winter.\par No wheezing or SOB.\par No change.\par Exercising\par No dermatologic complaints.\par \par \par

## 2022-12-23 DIAGNOSIS — R79.89 OTHER SPECIFIED ABNORMAL FINDINGS OF BLOOD CHEMISTRY: ICD-10-CM

## 2022-12-23 LAB
ACE BLD-CCNC: 80 U/L
ALBUMIN SERPL ELPH-MCNC: 4.9 G/DL
ALP BLD-CCNC: 75 U/L
ALT SERPL-CCNC: 46 U/L
ANION GAP SERPL CALC-SCNC: 19 MMOL/L
AST SERPL-CCNC: 43 U/L
BASOPHILS # BLD AUTO: 0.08 K/UL
BASOPHILS NFR BLD AUTO: 0.9 %
BILIRUB SERPL-MCNC: 0.2 MG/DL
BUN SERPL-MCNC: 16 MG/DL
CALCIUM SERPL-MCNC: 10.1 MG/DL
CHLORIDE SERPL-SCNC: 101 MMOL/L
CO2 SERPL-SCNC: 20 MMOL/L
CREAT SERPL-MCNC: 0.77 MG/DL
EGFR: 102 ML/MIN/1.73M2
EOSINOPHIL # BLD AUTO: 0.15 K/UL
EOSINOPHIL NFR BLD AUTO: 1.7 %
GLUCOSE SERPL-MCNC: 102 MG/DL
HCT VFR BLD CALC: 49.1 %
HGB BLD-MCNC: 16.7 G/DL
IMM GRANULOCYTES NFR BLD AUTO: 0.2 %
LYMPHOCYTES # BLD AUTO: 1.2 K/UL
LYMPHOCYTES NFR BLD AUTO: 13.2 %
MAN DIFF?: NORMAL
MCHC RBC-ENTMCNC: 31.3 PG
MCHC RBC-ENTMCNC: 34 GM/DL
MCV RBC AUTO: 92.1 FL
MONOCYTES # BLD AUTO: 1.06 K/UL
MONOCYTES NFR BLD AUTO: 11.7 %
NEUTROPHILS # BLD AUTO: 6.58 K/UL
NEUTROPHILS NFR BLD AUTO: 72.3 %
PLATELET # BLD AUTO: 191 K/UL
POTASSIUM SERPL-SCNC: 4.6 MMOL/L
PROT SERPL-MCNC: 7.8 G/DL
RBC # BLD: 5.33 M/UL
RBC # FLD: 13.5 %
SODIUM SERPL-SCNC: 140 MMOL/L
WBC # FLD AUTO: 9.09 K/UL

## 2023-12-13 ENCOUNTER — NON-APPOINTMENT (OUTPATIENT)
Age: 63
End: 2023-12-13

## 2023-12-18 ENCOUNTER — APPOINTMENT (OUTPATIENT)
Dept: PULMONOLOGY | Facility: CLINIC | Age: 63
End: 2023-12-18
Payer: COMMERCIAL

## 2023-12-18 VITALS
DIASTOLIC BLOOD PRESSURE: 78 MMHG | BODY MASS INDEX: 29.4 KG/M2 | RESPIRATION RATE: 15 BRPM | HEIGHT: 71 IN | HEART RATE: 58 BPM | SYSTOLIC BLOOD PRESSURE: 131 MMHG | WEIGHT: 210 LBS | OXYGEN SATURATION: 97 % | TEMPERATURE: 97.7 F

## 2023-12-18 DIAGNOSIS — D86.9 SARCOIDOSIS, UNSPECIFIED: ICD-10-CM

## 2023-12-18 DIAGNOSIS — R05.9 COUGH, UNSPECIFIED: ICD-10-CM

## 2023-12-18 DIAGNOSIS — H20.9 UNSPECIFIED IRIDOCYCLITIS: ICD-10-CM

## 2023-12-18 LAB — HEMOGLOBIN: 15.9

## 2023-12-18 PROCEDURE — 94729 DIFFUSING CAPACITY: CPT

## 2023-12-18 PROCEDURE — 85018 HEMOGLOBIN: CPT | Mod: QW

## 2023-12-18 PROCEDURE — 94727 GAS DIL/WSHOT DETER LNG VOL: CPT

## 2023-12-18 PROCEDURE — ZZZZZ: CPT

## 2023-12-18 PROCEDURE — 99214 OFFICE O/P EST MOD 30 MIN: CPT | Mod: 25

## 2023-12-18 PROCEDURE — 94010 BREATHING CAPACITY TEST: CPT

## 2023-12-18 NOTE — CONSULT LETTER
[Dear  ___] : Dear ~AGUILAR, [Consult Letter:] : I had the pleasure of evaluating your patient, [unfilled]. [Consult Closing:] : Thank you very much for allowing me to participate in the care of this patient.  If you have any questions, please do not hesitate to contact me. [Sincerely,] : Sincerely, [FreeTextEntry2] : Julius Jansen [FreeTextEntry3] : Frank Holt MD [facility]

## 2023-12-18 NOTE — PROCEDURE
[FreeTextEntry1] : 12/18/2023 Pulmonary function testing These data demonstrate a mild obstructive ventilatory deficit. Normal Lung Volumes. Normal Lung Volumes. Diffusion capacity is normal.  No significant change in function compared to December 2022.    Ct chest January 6, 2023 Multiple pulmonary nodules.  Groundglass nodule.  With stable adenopathy.

## 2023-12-18 NOTE — HISTORY OF PRESENT ILLNESS
[TextBox_4] : Dx uveitis in right eye previously had in left. On steroid drops. Otherwise relatively well. Had some urologic tests and had US on Sat.  Noted protein in urine.  May need nephrologist.  No cough, wheezing, CP or SOB. no joint issues. Some issues resolved with creams.

## 2023-12-18 NOTE — ASSESSMENT
[FreeTextEntry1] : ACE Level Interleukin 2 receptor F/U CT Ophthalmology follow-up. Further recommendations based upon above. F/u 1 year if stable.

## 2023-12-18 NOTE — DISCUSSION/SUMMARY
[FreeTextEntry1] : Clinical and radiographic findings consistent with sarcoidosis. Recurrent uveitis. No other significant active manifestations of sarcoid at this time.  Will obtain follow-up CT.  Lung function is stable.  Patient is clinically stable.

## 2023-12-22 ENCOUNTER — NON-APPOINTMENT (OUTPATIENT)
Age: 63
End: 2023-12-22

## 2023-12-22 LAB
ACE BLD-CCNC: 96 U/L
IL2 SERPL-MCNC: 550 PG/ML

## 2024-01-12 ENCOUNTER — NON-APPOINTMENT (OUTPATIENT)
Age: 64
End: 2024-01-12

## 2024-12-16 ENCOUNTER — APPOINTMENT (OUTPATIENT)
Dept: PULMONOLOGY | Facility: CLINIC | Age: 64
End: 2024-12-16
Payer: COMMERCIAL

## 2024-12-16 VITALS — HEART RATE: 86 BPM | DIASTOLIC BLOOD PRESSURE: 80 MMHG | SYSTOLIC BLOOD PRESSURE: 133 MMHG | OXYGEN SATURATION: 97 %

## 2024-12-16 DIAGNOSIS — G47.33 OBSTRUCTIVE SLEEP APNEA (ADULT) (PEDIATRIC): ICD-10-CM

## 2024-12-16 DIAGNOSIS — R80.9 PROTEINURIA, UNSPECIFIED: ICD-10-CM

## 2024-12-16 DIAGNOSIS — H20.9 UNSPECIFIED IRIDOCYCLITIS: ICD-10-CM

## 2024-12-16 DIAGNOSIS — D86.9 SARCOIDOSIS, UNSPECIFIED: ICD-10-CM

## 2024-12-16 PROCEDURE — 99214 OFFICE O/P EST MOD 30 MIN: CPT | Mod: 25

## 2024-12-16 PROCEDURE — 94010 BREATHING CAPACITY TEST: CPT

## 2024-12-16 PROCEDURE — ZZZZZ: CPT

## 2024-12-16 PROCEDURE — 94727 GAS DIL/WSHOT DETER LNG VOL: CPT

## 2024-12-16 PROCEDURE — 94729 DIFFUSING CAPACITY: CPT

## 2025-02-20 ENCOUNTER — EMERGENCY (EMERGENCY)
Facility: HOSPITAL | Age: 65
LOS: 1 days | Discharge: HOME | End: 2025-02-20
Attending: EMERGENCY MEDICINE
Payer: SELF-PAY

## 2025-02-20 VITALS
RESPIRATION RATE: 18 BRPM | HEART RATE: 79 BPM | OXYGEN SATURATION: 96 % | DIASTOLIC BLOOD PRESSURE: 82 MMHG | TEMPERATURE: 98 F | SYSTOLIC BLOOD PRESSURE: 134 MMHG

## 2025-02-20 VITALS
RESPIRATION RATE: 18 BRPM | HEIGHT: 67 IN | OXYGEN SATURATION: 95 % | WEIGHT: 208.78 LBS | SYSTOLIC BLOOD PRESSURE: 160 MMHG | TEMPERATURE: 98 F | DIASTOLIC BLOOD PRESSURE: 90 MMHG | HEART RATE: 84 BPM

## 2025-02-20 PROCEDURE — 73130 X-RAY EXAM OF HAND: CPT

## 2025-02-20 PROCEDURE — 73130 X-RAY EXAM OF HAND: CPT | Mod: 26,RT

## 2025-02-20 PROCEDURE — 71046 X-RAY EXAM CHEST 2 VIEWS: CPT | Mod: 26

## 2025-02-20 PROCEDURE — 93005 ELECTROCARDIOGRAM TRACING: CPT

## 2025-02-20 PROCEDURE — 71250 CT THORAX DX C-: CPT | Mod: MC

## 2025-02-20 PROCEDURE — 99284 EMERGENCY DEPT VISIT MOD MDM: CPT | Mod: 25

## 2025-02-20 PROCEDURE — 71250 CT THORAX DX C-: CPT | Mod: 26

## 2025-02-20 PROCEDURE — 99284 EMERGENCY DEPT VISIT MOD MDM: CPT

## 2025-02-20 PROCEDURE — 71046 X-RAY EXAM CHEST 2 VIEWS: CPT

## 2025-02-20 PROCEDURE — 93010 ELECTROCARDIOGRAM REPORT: CPT

## 2025-02-20 RX ORDER — ACETAMINOPHEN 160 MG/5ML
975 SUSPENSION ORAL ONCE
Refills: 0 | Status: COMPLETED | OUTPATIENT
Start: 2025-02-20 | End: 2025-02-20

## 2025-02-20 RX ORDER — IBUPROFEN 600 MG/1
600 TABLET, FILM COATED ORAL ONCE
Refills: 0 | Status: COMPLETED | OUTPATIENT
Start: 2025-02-20 | End: 2025-02-20

## 2025-02-20 RX ORDER — METHOCARBAMOL 500 MG
1000 TABLET ORAL ONCE
Refills: 0 | Status: COMPLETED | OUTPATIENT
Start: 2025-02-20 | End: 2025-02-20

## 2025-02-20 RX ADMIN — IBUPROFEN 600 MILLIGRAM(S): 600 TABLET, FILM COATED ORAL at 22:17

## 2025-02-20 RX ADMIN — ACETAMINOPHEN 975 MILLIGRAM(S): 160 SUSPENSION ORAL at 22:16

## 2025-02-20 RX ADMIN — Medication 1000 MILLIGRAM(S): at 22:17
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
## 2025-02-20 NOTE — ED PROVIDER NOTE - RESPIRATORY, MLM
Breath sounds clear and equal bilaterally. TTP lower third of the sternum, no chest wall ecchymosis or abrasions

## 2025-02-20 NOTE — ED PROVIDER NOTE - PATIENT PORTAL LINK FT
You can access the FollowMyHealth Patient Portal offered by [facility] by registering at the following website: [email or web address]. By joining [facility]’s FollowMyHealth portal, you will also be able to view your health information using other applications (apps) compatible with our system.
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
## 2025-02-21 PROCEDURE — 99284 EMERGENCY DEPT VISIT MOD MDM: CPT | Mod: 1L

## 2025-02-21 RX ORDER — METHOCARBAMOL 500 MG
2 TABLET ORAL
Qty: 18 | Refills: 0
Start: 2025-02-21 | End: 2025-02-23

## 2025-02-21 RX ORDER — IBUPROFEN 600 MG/1
1 TABLET, FILM COATED ORAL
Qty: 20 | Refills: 0
Start: 2025-02-21 | End: 2025-02-25
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
## 2025-03-12 ENCOUNTER — APPOINTMENT (OUTPATIENT)
Dept: ORTHOPEDIC SURGERY | Facility: CLINIC | Age: 65
End: 2025-03-12
Payer: COMMERCIAL

## 2025-03-12 VITALS — HEIGHT: 71 IN | BODY MASS INDEX: 28.84 KG/M2 | WEIGHT: 206 LBS

## 2025-03-12 DIAGNOSIS — S63.639A SPRAIN OF INTERPHALANGEAL JOINT OF UNSPECIFIED FINGER, INITIAL ENCOUNTER: ICD-10-CM

## 2025-03-12 PROCEDURE — 73140 X-RAY EXAM OF FINGER(S): CPT | Mod: RT

## 2025-03-12 PROCEDURE — 99203 OFFICE O/P NEW LOW 30 MIN: CPT

## 2025-03-18 PROBLEM — S63.639A SPRAIN OF PROXIMAL INTERPHALANGEAL (PIP) JOINT OF FINGER: Status: ACTIVE | Noted: 2025-03-18

## 2025-03-19 ENCOUNTER — APPOINTMENT (OUTPATIENT)
Dept: ORTHOPEDIC SURGERY | Facility: CLINIC | Age: 65
End: 2025-03-19

## 2025-06-13 ENCOUNTER — APPOINTMENT (OUTPATIENT)
Dept: PULMONOLOGY | Facility: CLINIC | Age: 65
End: 2025-06-13

## 2025-06-24 ENCOUNTER — APPOINTMENT (OUTPATIENT)
Dept: PULMONOLOGY | Facility: CLINIC | Age: 65
End: 2025-06-24
Payer: COMMERCIAL

## 2025-06-24 VITALS
SYSTOLIC BLOOD PRESSURE: 134 MMHG | RESPIRATION RATE: 16 BRPM | OXYGEN SATURATION: 96 % | HEART RATE: 77 BPM | DIASTOLIC BLOOD PRESSURE: 78 MMHG

## 2025-06-24 PROBLEM — G12.21 ALS (AMYOTROPHIC LATERAL SCLEROSIS): Status: ACTIVE | Noted: 2025-06-24

## 2025-06-24 PROCEDURE — 94060 EVALUATION OF WHEEZING: CPT

## 2025-06-24 PROCEDURE — 99214 OFFICE O/P EST MOD 30 MIN: CPT | Mod: 25

## 2025-06-24 PROCEDURE — 95012 NITRIC OXIDE EXP GAS DETER: CPT

## 2025-06-24 RX ORDER — PANTOPRAZOLE 40 MG/1
40 TABLET, DELAYED RELEASE ORAL
Qty: 30 | Refills: 2 | Status: ACTIVE | COMMUNITY
Start: 2025-06-24 | End: 1900-01-01

## 2025-06-24 RX ORDER — LOSARTAN POTASSIUM 100 MG/1
100 TABLET, FILM COATED ORAL
Refills: 0 | Status: ACTIVE | COMMUNITY
Start: 2025-06-24

## 2025-06-24 RX ORDER — DAPAGLIFLOZIN 10 MG/1
10 TABLET, FILM COATED ORAL
Refills: 0 | Status: ACTIVE | COMMUNITY
Start: 2025-06-24

## 2025-08-19 ENCOUNTER — APPOINTMENT (OUTPATIENT)
Dept: INTERNAL MEDICINE | Facility: CLINIC | Age: 65
End: 2025-08-19